# Patient Record
Sex: FEMALE | Race: WHITE
[De-identification: names, ages, dates, MRNs, and addresses within clinical notes are randomized per-mention and may not be internally consistent; named-entity substitution may affect disease eponyms.]

---

## 2018-11-29 ENCOUNTER — HOSPITAL ENCOUNTER (EMERGENCY)
Dept: HOSPITAL 92 - ERS | Age: 68
Discharge: HOME | End: 2018-11-29
Payer: COMMERCIAL
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
## 2018-11-29 DIAGNOSIS — N39.0: ICD-10-CM

## 2018-11-29 DIAGNOSIS — S32.019A: Primary | ICD-10-CM

## 2018-11-29 LAB
ALBUMIN SERPL BCG-MCNC: 3.9 G/DL (ref 3.4–4.8)
ALP SERPL-CCNC: 69 U/L (ref 40–150)
ALT SERPL W P-5'-P-CCNC: 24 U/L (ref 8–55)
ANION GAP SERPL CALC-SCNC: 11 MMOL/L (ref 10–20)
APTT PPP: 28.1 SEC (ref 22.9–36.1)
AST SERPL-CCNC: 27 U/L (ref 5–34)
BACTERIA UR QL AUTO: (no result) HPF
BASOPHILS # BLD AUTO: 0 THOU/UL (ref 0–0.2)
BASOPHILS NFR BLD AUTO: 0.7 % (ref 0–1)
BILIRUB SERPL-MCNC: 0.4 MG/DL (ref 0.2–1.2)
BUN SERPL-MCNC: 23 MG/DL (ref 9.8–20.1)
CALCIUM SERPL-MCNC: 9.7 MG/DL (ref 7.8–10.44)
CHLORIDE SERPL-SCNC: 105 MMOL/L (ref 98–107)
CO2 SERPL-SCNC: 27 MMOL/L (ref 23–31)
CREAT CL PREDICTED SERPL C-G-VRATE: 0 ML/MIN (ref 70–130)
CRYSTAL-AUWI FLAG: 0 (ref 0–15)
EOSINOPHIL # BLD AUTO: 0.2 THOU/UL (ref 0–0.7)
EOSINOPHIL NFR BLD AUTO: 2.3 % (ref 0–10)
GLOBULIN SER CALC-MCNC: 3 G/DL (ref 2.4–3.5)
GLUCOSE SERPL-MCNC: 97 MG/DL (ref 80–115)
HEV IGM SER QL: 0 (ref 0–7.99)
HGB BLD-MCNC: 13.7 G/DL (ref 12–16)
HYALINE CASTS #/AREA URNS LPF: (no result) LPF
INR PPP: 1
LYMPHOCYTES # BLD: 1.4 THOU/UL (ref 1.2–3.4)
LYMPHOCYTES NFR BLD AUTO: 20.5 % (ref 21–51)
MCH RBC QN AUTO: 31.8 PG (ref 27–31)
MCV RBC AUTO: 91.2 FL (ref 78–98)
MONOCYTES # BLD AUTO: 0.8 THOU/UL (ref 0.11–0.59)
MONOCYTES NFR BLD AUTO: 11.3 % (ref 0–10)
NEUTROPHILS # BLD AUTO: 4.5 THOU/UL (ref 1.4–6.5)
NEUTROPHILS NFR BLD AUTO: 65.2 % (ref 42–75)
PATHC CAST-AUWI FLAG: 0.14 (ref 0–2.49)
PLATELET # BLD AUTO: 220 THOU/UL (ref 130–400)
POTASSIUM SERPL-SCNC: 4.4 MMOL/L (ref 3.5–5.1)
PROTHROMBIN TIME: 12.9 SEC (ref 12–14.7)
RBC # BLD AUTO: 4.3 MILL/UL (ref 4.2–5.4)
RBC UR QL AUTO: (no result) HPF (ref 0–3)
SODIUM SERPL-SCNC: 139 MMOL/L (ref 136–145)
SP GR UR STRIP: 1.04 (ref 1–1.04)
SPERM-AUWI FLAG: 0 (ref 0–9.9)
WBC # BLD AUTO: 6.9 THOU/UL (ref 4.8–10.8)
WBC UR QL AUTO: (no result) HPF (ref 0–3)
YEAST-AUWI FLAG: 0 (ref 0–25)

## 2018-11-29 PROCEDURE — 72125 CT NECK SPINE W/O DYE: CPT

## 2018-11-29 PROCEDURE — 86850 RBC ANTIBODY SCREEN: CPT

## 2018-11-29 PROCEDURE — 86900 BLOOD TYPING SEROLOGIC ABO: CPT

## 2018-11-29 PROCEDURE — G0390 TRAUMA RESPONS W/HOSP CRITI: HCPCS

## 2018-11-29 PROCEDURE — 71045 X-RAY EXAM CHEST 1 VIEW: CPT

## 2018-11-29 PROCEDURE — 70450 CT HEAD/BRAIN W/O DYE: CPT

## 2018-11-29 PROCEDURE — 81003 URINALYSIS AUTO W/O SCOPE: CPT

## 2018-11-29 PROCEDURE — 74177 CT ABD & PELVIS W/CONTRAST: CPT

## 2018-11-29 PROCEDURE — 80053 COMPREHEN METABOLIC PANEL: CPT

## 2018-11-29 PROCEDURE — 81015 MICROSCOPIC EXAM OF URINE: CPT

## 2018-11-29 PROCEDURE — 96374 THER/PROPH/DIAG INJ IV PUSH: CPT

## 2018-11-29 PROCEDURE — 85025 COMPLETE CBC W/AUTO DIFF WBC: CPT

## 2018-11-29 PROCEDURE — 85610 PROTHROMBIN TIME: CPT

## 2018-11-29 PROCEDURE — 85730 THROMBOPLASTIN TIME PARTIAL: CPT

## 2018-11-29 PROCEDURE — 71260 CT THORAX DX C+: CPT

## 2018-11-29 PROCEDURE — 86901 BLOOD TYPING SEROLOGIC RH(D): CPT

## 2018-11-29 NOTE — CT
CERVICAL SPINE CT NONCONTRAST:

 

INDICATION: 

Posttraumatic injury with pain.

 

FINDINGS: 

There are scattered areas of degenerative abnormality throughout the cervical spine as well as areas 
of flocculent heterotopic density.  No discrete evidence for an acute fracture, subluxation, or retro
pulsion of bone.  NO craniocervical distraction injury.

 

IMPRESSION: 

1.  No acute cervical spine fracture.

 

2.  Notification placed at 0830 hours to Dr. Brown of the ER department.

 

CODE CR

 

POS: [facility]

## 2018-11-29 NOTE — CT
CHEST AND ABDOMEN AND PELVIC CT SCAN WITH IV CONTRAST

THORACIC SPINE CT SCAN WITH IV CONTRAST LIMITED

LUMBAR SPINE CT SCAN WITH IV CONTRAST LIMITED:

 

FINDINGS: 

 

CHEST, ABDOMEN, AND PELVIC CT SCAN WITH IV CONTRAST:

No evidence of pneumothorax.  NO pleural effusion or pericardial effusion.  Minimal posterior positio
nal changes in both posterior lower lungs.  No mediastinal hematoma.  The aorta is unremarkable.

 

In the abdomen, the liver, gallbladder, pancreas, spleen, and adrenal glands are unremarkable.  A sma
ll scar or cyst associated with the left kidney.  No renal calculus or acute  obstruction.  No sign
ificant free intraperitoneal fluid or retroperitoneal hematoma.  

 

THORACIC SPINE CT SCAN WITH IV CONTRAST LIMTIED:

 

IMPRESSION: 

Multilevel disk-osteophytosis.  No evidence for acute fracture or dislocation.

 

 

LUMBAR SPINE CT SCAN WITH IV CONTRAST LIMITED:

 

IMPRESSION: 

1.  Mild anterior superior compression fracture of L1.  No retropulsion.  No malalignment.  Generaliz
ed spondylosis with moderate to severe canal stenosis at L4-L5.  

 

2.  Mild compression fracture of L1 superior end plate without malalignment or retropulsion.

 

Findings were discussed with Dr. Brown at 8:55 a.m.

 

CODE CR

 

POS: [facility]

## 2018-11-29 NOTE — CT
CT OF HEAD NONCONTRAST:

 

INDICATION: 

Posttraumatic injury, pain.

 

FINDINGS: 

There is no evidence of ventriculomegaly, mass effect, midline shift, or acute intracranial hemorrhag
e.  The calvarium is intact.  There is no depressed fracture of the calvarium or evidence of pneumoce
phalus.  No acute fluid level of the imaged paranasal sinuses.

 

IMPRESSION: 

1.  No acute intracranial hemorrhage or mass effect.

 

2.  Findings most consistent with mild chronic ischemic disease.

 

Telephone call of findings placed at 0830 hours to the ER physician, Dr. Jerry Brown.

 

CODE CR

 

POS: [facility]

## 2024-04-09 NOTE — RAD
FRONTAL VIEW CHEST:

 

INDICATION: 

Posttraumatic pain.

 

FINDINGS: 

There is a healed, chronic deformity of the right clavicle.  No evidence of consolidation, effusion, 
or discrete pneumothorax.  The cardiac silhouette is accentuated by portable technique.

 

IMPRESSION: 

No focal consolidation.

 

POS: [facility]
THREE VIEWS RIGHT SHOULDER:

 

INDICATION: 

Posttraumatic pain.

 

FINDINGS: 

There is mild osteoarthritis.  No evidence of fracture or dislocation. There is a healed, chronic def
ormity of the right clavicle.  

 

IMPRESSION: 

No acute osseous abnormality of the right shoulder.

 

POS: [facility]
No